# Patient Record
Sex: FEMALE | Race: WHITE
[De-identification: names, ages, dates, MRNs, and addresses within clinical notes are randomized per-mention and may not be internally consistent; named-entity substitution may affect disease eponyms.]

---

## 2017-10-11 ENCOUNTER — HOSPITAL ENCOUNTER (OUTPATIENT)
Dept: HOSPITAL 17 - HCAT | Age: 82
LOS: 1 days | Discharge: HOME | End: 2017-10-12
Attending: INTERNAL MEDICINE
Payer: MEDICARE

## 2017-10-11 VITALS
TEMPERATURE: 98.2 F | HEART RATE: 83 BPM | OXYGEN SATURATION: 98 % | RESPIRATION RATE: 19 BRPM | DIASTOLIC BLOOD PRESSURE: 72 MMHG | SYSTOLIC BLOOD PRESSURE: 132 MMHG

## 2017-10-11 VITALS
RESPIRATION RATE: 14 BRPM | TEMPERATURE: 97.7 F | HEART RATE: 77 BPM | OXYGEN SATURATION: 98 % | DIASTOLIC BLOOD PRESSURE: 72 MMHG | SYSTOLIC BLOOD PRESSURE: 156 MMHG

## 2017-10-11 VITALS
HEART RATE: 79 BPM | SYSTOLIC BLOOD PRESSURE: 146 MMHG | TEMPERATURE: 98.7 F | DIASTOLIC BLOOD PRESSURE: 72 MMHG | OXYGEN SATURATION: 100 % | RESPIRATION RATE: 18 BRPM

## 2017-10-11 VITALS — HEART RATE: 84 BPM

## 2017-10-11 VITALS
TEMPERATURE: 97.7 F | HEART RATE: 84 BPM | DIASTOLIC BLOOD PRESSURE: 61 MMHG | OXYGEN SATURATION: 96 % | RESPIRATION RATE: 16 BRPM | SYSTOLIC BLOOD PRESSURE: 110 MMHG

## 2017-10-11 VITALS — HEART RATE: 76 BPM

## 2017-10-11 VITALS — HEART RATE: 82 BPM

## 2017-10-11 VITALS — BODY MASS INDEX: 26.35 KG/M2 | WEIGHT: 154.32 LBS | HEIGHT: 64 IN

## 2017-10-11 VITALS — HEART RATE: 80 BPM

## 2017-10-11 DIAGNOSIS — I10: ICD-10-CM

## 2017-10-11 DIAGNOSIS — I25.110: Primary | ICD-10-CM

## 2017-10-11 DIAGNOSIS — E78.5: ICD-10-CM

## 2017-10-11 DIAGNOSIS — J44.9: ICD-10-CM

## 2017-10-11 LAB
ANION GAP SERPL CALC-SCNC: 8 MEQ/L (ref 5–15)
BASOPHILS # BLD AUTO: 0 TH/MM3 (ref 0–0.2)
BASOPHILS NFR BLD: 0.5 % (ref 0–2)
BUN SERPL-MCNC: 24 MG/DL (ref 7–18)
CHLORIDE SERPL-SCNC: 111 MEQ/L (ref 98–107)
EOSINOPHIL # BLD: 0.1 TH/MM3 (ref 0–0.4)
EOSINOPHIL NFR BLD: 1.2 % (ref 0–4)
ERYTHROCYTE [DISTWIDTH] IN BLOOD BY AUTOMATED COUNT: 13.7 % (ref 11.6–17.2)
GFR SERPLBLD BASED ON 1.73 SQ M-ARVRAT: 74 ML/MIN (ref 89–?)
HCO3 BLD-SCNC: 22.4 MEQ/L (ref 21–32)
HCT VFR BLD CALC: 39.3 % (ref 35–46)
HEMO FLAGS: (no result)
INR PPP: 0.9 RATIO
LYMPHOCYTES # BLD AUTO: 1.9 TH/MM3 (ref 1–4.8)
LYMPHOCYTES NFR BLD AUTO: 32.6 % (ref 9–44)
MCH RBC QN AUTO: 28.7 PG (ref 27–34)
MCHC RBC AUTO-ENTMCNC: 32.5 % (ref 32–36)
MCV RBC AUTO: 88.1 FL (ref 80–100)
MONOCYTES NFR BLD: 7.5 % (ref 0–8)
NEUTROPHILS # BLD AUTO: 3.4 TH/MM3 (ref 1.8–7.7)
NEUTROPHILS NFR BLD AUTO: 58.2 % (ref 16–70)
PLATELET # BLD: 217 TH/MM3 (ref 150–450)
POTASSIUM SERPL-SCNC: 4.1 MEQ/L (ref 3.5–5.1)
PROTHROMBIN TIME: 10.3 SEC (ref 9.8–11.6)
RBC # BLD AUTO: 4.46 MIL/MM3 (ref 4–5.3)
SODIUM SERPL-SCNC: 141 MEQ/L (ref 136–145)
WBC # BLD AUTO: 5.8 TH/MM3 (ref 4–11)

## 2017-10-11 PROCEDURE — C1769 GUIDE WIRE: HCPCS

## 2017-10-11 PROCEDURE — 85610 PROTHROMBIN TIME: CPT

## 2017-10-11 PROCEDURE — C1893 INTRO/SHEATH, FIXED,NON-PEEL: HCPCS

## 2017-10-11 PROCEDURE — 82550 ASSAY OF CK (CPK): CPT

## 2017-10-11 PROCEDURE — 80048 BASIC METABOLIC PNL TOTAL CA: CPT

## 2017-10-11 PROCEDURE — C1876 STENT, NON-COA/NON-COV W/DEL: HCPCS

## 2017-10-11 PROCEDURE — 85002 BLEEDING TIME TEST: CPT

## 2017-10-11 PROCEDURE — 85347 COAGULATION TIME ACTIVATED: CPT

## 2017-10-11 PROCEDURE — 93458 L HRT ARTERY/VENTRICLE ANGIO: CPT

## 2017-10-11 PROCEDURE — 85025 COMPLETE CBC W/AUTO DIFF WBC: CPT

## 2017-10-11 PROCEDURE — C1887 CATHETER, GUIDING: HCPCS

## 2017-10-11 PROCEDURE — 92920 PRQ TRLUML C ANGIOP 1ART&/BR: CPT

## 2017-10-11 PROCEDURE — 93005 ELECTROCARDIOGRAM TRACING: CPT

## 2017-10-11 RX ADMIN — ACETAMINOPHEN PRN MG: 325 TABLET ORAL at 22:24

## 2017-10-11 RX ADMIN — TIROFIBAN SCH MLS/HR: 5 INJECTION, SOLUTION INTRAVENOUS at 13:41

## 2017-10-11 RX ADMIN — Medication SCH ML: at 21:00

## 2017-10-11 RX ADMIN — ACETAMINOPHEN PRN MG: 325 TABLET ORAL at 15:29

## 2017-10-11 NOTE — MA
cc:

KEV COOPER M.D.

****

 

 

DATE: 10/11/2017

 

PROCEDURE PERFORMED

Left heart catheterization, left ventriculography, coronary angiography, FFR of

the mid-LAD and PCI with bare metal stent of the mid-LAD.

 

INDICATION

Unstable angina with moderate chest pressure at 3 minutes of the Denny

protocol, small fixed defect and reversible defect in the apex, EF 60%,

[place] [employer] Class 2-3 angina, coronary artery disease,

intolerance to statins.

 

PROCEDURE

The patient was brought to the cardiac catheterization lab, prepped and draped

in the usual sterile fashion. 10 ccs of 1% lidocaine was used to locally

anesthetize the right common femoral artery. A 4-[place]ican sheath was placed in

the right common femoral artery. A 4-[place] JR-4, JL-4 catheters were used to

perform left and right coronary angiography, left ventriculography.

 

FINDINGS

LV pressures 160/8-15, ejection fraction 60%.  The right coronary artery is

dominant.  There are minimal luminal irregularities up to 5-10%

angiographically in the mid segment. Left main coronary artery has a distal 20%

stenosis.  The left circumflex vessel has a long ostial proximal 40-50%

stenosis, it is very tortuous in the proximal segment, has a 90 degree bend off

the left main and then after about 10 mm a second 90 degree bend anteriorly.

The

third obtuse marginal vessel is a medium to large size vessel with smooth

proximal 20% stenosis.  The remainder of the AV groove left circumflex vessel

is a small vessel probably 1 mm in diameter, it does have a fairly high-grade

lesion just after the obtuse marginal vessel with estimated 60-70%. There is a

ramus intermedius vessel which is a medium to large size vessel which has a

mild diffuse disease in the proximal segment up to 20% angiographically. In the

mid to distal segment just proximal to a bifurcation there is a 30-40%

stenosis. There also appears to be either a second ramus intermedius

vessel/high obtuse marginal vessel that is small to medium in size with no

significant disease angiographically.  The LAD is non-transapical.

The stent in the prox mid segment has moderate diffuse in-stent stenosis up to

perhaps 50% angiographically.  At the distal stent margin and just probably a

millimeter or 2 millimeters distal to the stent margin there is a focal 50%

stenosis.  There is a long 60-70% stenosis in the ieh-rt-tuchcd segment just

beginning at the bifurcation with the second diagonal artery.  The first

diagonal artery is a small vessel probably 0.5 mm in diameter, mild diffuse

disease in the ostial proximal segment up to 20%

angiographically.  Second diagonal artery is small to medium size vessel, no

significant disease angiographically.  Third diagonal artery is small to

medium-sized vessel, no significant disease angiographically.  6-[place]ican sheath

was exchanged for the 4-[place]ican sheath. 60 units per kilo of heparin was given.

ACT was 292.  6-C[place]ican XB 3.5 guide, a .014 volcano pressure wire was placed

into the proximal LAD.  The introducer was removed, the ___ was firmly

fastened. The guide catheter was thoroughly flushed with 20 ccs of normal

saline. Pressure waveforms were normalized. The pressure wire was then advanced

to the distal LAD and the IFR was .85, this was recorded. Based on this finding

decision was made to proceed with PCI of the cap-en-sqnpcw LAD lesion. I

directly place a 2-5, 8 integrity stent, one inflation 10 atmospheres for 20

seconds.  This did reproduce the patient's chest pain, although it was more

severe then what she had on the treadmill.

Symptoms were relieved after balloon deflation. Stenosis went from 70% to 0%

with GENOVEVA-III flow. There was no compromise of the ostial diagonal vessel.  I

did stent back to the ostium of that  diagonal vessel.

 

CONCLUSION

1. New onset cardiac symptom of chest pain with fairly minimal exertion 3

     minutes of the standard Denny protocol, small fixed defect, reversible

     defect in the apical wall, nuclear stress test culprit, 70% stenosis in

     the xwp-yc-sfbkrz LAD with IFR of .85 as detailed above.

2. Otherwise mild to moderate three-vessel coronary artery disease with

     moderate diffuse in-stent stenosis of the proximal mid LAD up to 50%

     angiographically with a focal 50% stenosis approximately a millimeter

     beyond the distal stent margin as detailed above.

3. Normal LV systolic function, ejection fraction 60%.

4. IFR of the msc-gs-rgipnu LAD of .85.

5. Successful direct PCI with bare metal stent of the bro-tz-vcpwer LAD from

     70% to 0% with GENOVEVA-III flow.

6. Recommend Plavix 600 mg p.o. load then 75 mg a day for 12-15 months, aspirin

     162 mg daily, Aggrastat drip per protocol.  The patient is intolerant of

     statins.

 

 

 

                              _________________________________

                              MD NONA Meléndez/ISACC

D:  10/11/2017/1:34 PM

T:  10/11/2017/1:53 PM

Visit #:  [id number]

Job #:  [id number]

## 2017-10-11 NOTE — CATHPROC
@Pay HIS Report

Study Information

Study Number    Admission            Scheduled Start             Study Start

 

[id number]    Oct 11 2017 10:25AM       10/11/2017                Oct 11 2017 11:57AM

 

Universal Service

 

Cardiac Catheterization

 

Admit Source                Facility Department

 

Transfer in from another acute care facility [facility] - Cath Lab

 

Physician and Clinical Staff

Initial Timmy Ferrara          Circulator     Christen Guerrero,RN

 

                           Recorder      Jaz Hernandez,RT(R)

 

                           Scrub        Gretta Calvo,RT(R)

 

Procedures Performed

Procedure                      Location (Site)           Vessel Name

 

Coronary Angiograms                  LCA                Left Coronary

Coronary Angiograms                  RCA                Right Coronary

L Heart Cath

LV Gram-hand inj. LV                 LV                 Ventricle

Stent                        LAD Mid               Left Coronary

Equipment

Time           Description           Size      Mfg Part Number  Used/Scraped

                   TRANSDUCER, TRUWAVE               [id number]

12:07   FARRAR ALFARO                       *                Used

                   W/STOCKCOCK                   *[id number]

                                           [phone]



                                           *[id number]

                                           [phone]



                                           *[phone]

                                           [phone]



                                           *[id number]

                                           [id number]

12:07   [employer]    PACK, CCL CUSTOM         *                Used

                                           *[id number]

                                           [id number]

12:07   [employer] PACER       PEN, SKIN DUAL W/ RULER     *                Used

                                           *[id number]

                                           [id number]

13:25   [employer]         STENT, 2.5 8 INTEGRITY      2.5 8              Used

                                           *[id number]

                                           [id number]

13:26   [employer]       30 ROJAS INDEFLATOR                         Used

                                           *[id number]

                                           [id number]

13:09   [employer]       SHEATH, FR6.5 PRELUDE 11CM    FR 6.5     038ACT      Used

                                           *[id number]

                                           [id number]

12:07   [employer]       WIRE, 3MMJ .035 180CM      180CM              Used

                                           *[id number]

                                           [id number]

12:07   [employer]           MANIFOLD, 4 PORT         *                Used

                                           *[id number]

12:07   [employer]          OMNIPAQUE, 350 MG, 150ML     150ML     [id number]      Used

                                           [id number]

12:07   [employer]       BLANKET,WARM AIR CCL       *                Used

                                           *[id number]

                                           [id number]

12:07   [employer]      SHEATH, FR4 TERUMO (10CM)    FR 4               Used

                                           *[id number]

13:15   [employer]          PRIME WIRE, VERRATA 185CM    185CM     [id number] *[id number] Used

                   STENT, 2.5 8 RESOLUTE              [id number]   Scrap: Staff

13:30   [employer]                         2.5 8

                   INTEGRITY RX                  *[id number]     Error

 

Equipment Model, Serial, Lot Number and Expiration Data

Description             Model Number       Serial Number  Lot Number       Expiration Date

 

STENT, 2.5 8 INTEGRITY        [id number]                [id number]       02-

STENT, 2.5 8 RESOLUTE

                   [id number]               [id number]       04-

INTEGRITY RX

 

History: Allergies

Allergy              Reaction

 

Sulfa (Sulfonamide Antibiotics)  NAUSEA

 

morphine             NAUSEA

 

penicillin G           Hives

History: Risk Factors

                    Family History of

Hypertension   Dyslipidemia                  Previous MI    Previous Heart Failure

                    Premature CAD

No        Yes          Yes            No         No

Prior Valve

         Prior PCI       Prior PCIDate       Prior CABG

Surgery

No        Yes          01/01/2012        No

         Cerebrovascular    Peripheral Artery     Chronic Lung

On Dialysis                                     Diabetes

         Disease        Disease          Disease

No        No          No            Yes        No

 

 

History: Stress Tests

Stress or Imaging Studies Performed

 

No

 

 

History: Other

Current Smoker     Method     Quit            Packs a Day       Years Used     Pack Years

 

No           Cigarettes   30 Years Ago        1            40         40

 

Labs

Hgb (g/dl)       Hct (%)        WBC (l/cumm)        Platelets (thousands)

 

11.60-17.00      35.00-51.00      4.00-11.00         150..00

 

12.8          39.3         5.8             217

 

Glucose (mg/dl)    BUN (mg/dl)      Creatinine (mg/dl)    BUN:Creatinine (1:x)

74..00      7.00-18.00      0.50-1.30         10.00-20.00

 

90           24          0.7            34.3

 

Na (meq/l)       K (meq/l)

 

136..00     3.50-5.10

 

141          4.1

 

INR (PTT:PT)

 

0.90-1.10

 

0.9

 

CPK-MB (ng/ML)

 

0.50-3.60

 

Not Drawn

 

 

 

 

Medication

Medication Total Dose (Bolus/Oral)

Medication              Total Dosage/Unit

 

1% XYLOCAINE                20 mL

 

AGGRASTAT BOLUS              34.6 mL

 

FENTANYL                 12.5 mcg

 

HEPARIN                 4000 units

 

NITRO OINTMENT               2 inches

 

PLAVIX                  600 mg

 

VERSED                   2 mg

 

Medications (Bolus/Oral)

Medication          Time Given          Dosage/Unit      Administered By      Reason

               10/11/2017 12:55:32

1% XYLOCAINE                        20 mL         Timmy Dawkins

               PM

20 mL 1% XYLOCAINE given in lab by Timmy Dawkins in Right Groin via Subcutaneous.

 

VERSED            10/11/2017 1:06:00 PM      1 mg         Christen Guerrero

1 mg VERSED given in lab by Christen Guerrero, RN in Left Antecubital via Peripheral IV. Ordered by Timmy Pham.

 

FENTANYL           10/11/2017 1:08:00 PM    12.5 mcg        Christen Guerrero

12.5 mcg FENTANYL given in lab by Christen Guerrero RN in Left Antecubital via Peripheral IV. Ordered 
by Timmy Dawkins.

 

HEPARIN           10/11/2017 1:09:33 PM    4000 units       Christen Guerrero

4000 units HEPARIN given in lab by Christen Guerrero RN in Right Groin via Peripheral IV. Ordered by Timmy Butler.

 

NITRO OINTMENT        10/11/2017 1:12:00 PM      2 inches       Christen Guerrero

2 inches NITRO OINTMENT given in lab by Christen Guerrero RN. Ordered by Timmy Dawkins.

 

VERSED            10/11/2017 1:14:00 PM      1 mg         Christen Guerrero

1 mg VERSED given in lab by Christen Guerrero RN in Left Antecubital via Peripheral IV. Ordered by Timmy Pham.

 

AGGRASTAT BOLUS       10/11/2017 1:32:00 PM    34.6 mL         Christen Guerrero

34.6 mL AGGRASTAT BOLUS given in lab by Christen Guerrero RN in Left Antecubital via Peripheral IV. Or
dered by Timmy Dawkins.

 

PLAVIX            10/11/2017 1:35:00 PM     600 mg         Christen Guerrero

600 mg PLAVIX given in lab by Christen Guerrero RN via Oral. Ordered by Timmy Dawkins.

 

Medication (Drip)

Medication          Time Given          Dosage/Unit      Concentration/Unit  Diluent (ml)   Solution

 

AGGRASTAT DRIP        10/11/2017 1:33:00 PM    0.15 mcg/kg/min       12.5 mg     250        NaCl .9

0.15 mcg/kg/min AGGRASTAT DRIP given in lab by Christen Guerrero RN in Left Antecubital via Peripheral
 IV. Pump/Drip Flow = 12.4 ml/hr using NaCl

.9 with a concentration of 12.5 mg in 250 ml. Ordered by Timmy Dawkins.

Initial Case Assessment

Cardiovascular

HR           Rhythm          NIBP          Chest Pain

 

84           sr            168/100         0

 

Edema Present      Skin color           Skin

 

None           Normal             Warm Dry

 

Circulatory - Right Pulses

Dorsalis Pedis     Femoral

 

1            2

 

Scale (0,1,2,3,4,d)

 

Circulatory - Left Pulses

Dorsalis Pedis     Femoral

 

2            2

 

Scale (0,1,2,3,4,d)

 

Neurological State

            Oriented to time-place-

Alert                      Moves all extremities

            person

 

Respiration - General

Respiration Rate

            SpO2 (%)

(B/min)

15           100

 

Chronological Log

Time    Study Chronological Log

 

12:07:12  Patient arrived via Bed.

 

12:07:13  Patient Name, D.O.B, / Armband Verified By R.N.

 

12:07:13  Consent signed by the physician and the patient and verified by the Cath Lab staff.

 

12:07:14  Pre-op and post- op instructions given; patient acknowledges understanding of instructions.


 

12:07:14  Verbal Stimulation=2 Physical Stimulation=2 Airway=2 Respiration=2 TOTAL=8. (0=absent, 1=li
mited, 2=present)

 

12:07:15  Presedation assessment performed by Cath Lab RN.

 

12:07:16  Immediate Presedation assesment performed by physician.

 

12:07:17  Patient has been NPO for More than 6Hrs.

 

12:07:17  Skin Breakdown- none per pt

 

12:07:18  Patient Warmer Placed on the Table.

 

12:07:19  Cheyanne Prominences Protected

 

12:07:21  A # 22 IV was noted in the Antecubital (left). Grade = 0

 

12:07:22  History and physical on the chart or being dictated.

      Vitals capture started with the following parameters, Patient=Adult, Interval=5 min, Initial Pr
rcbxmq=023 mmHg,

12:14:27

      Deflation Rate=5 mmHg, Cuff placed on Right Arm

12:15:02  HR=90 bpm, WGJG=095/100 mmhg, SpO2=99.0 %, Resp=13 B/min

      Assessment: Initial Case, HR=84 BPM, Rhythm=sr, QNOD=997/100 mmhg, Chest Pain=0, Edema=None, Co
radha=Normal,

      Skin = Warm, Dry

      Right Pulses: Alfredo Ped=1, Femoral=2

12:17:03

      Left Pulses: Alfredo Ped=2, Femoral=2

      Neurological: State=Alert, Ox3, CALHOUN

      Respiration: Resp=15 B/min, YgX4=425 %

12:19:02  Reference ECG taken

 

12:20:07  HR=84 bpm, SQEW=196/91 mmhg, TvG4=543.0 %, Resp=10 B/min

 

12:21:09  Bilateral groins prepped with 2% chlorhexidine, and draped after a 3 minute waiting time.

 

12:25:06  HR=86 bpm, YXMC=696/94 mmhg, SpO2=99.0 %, Resp=20 B/min

 

12:28:14  MD paged

 

12:30:07  HR=82 bpm, FQBQ=334/85 mmhg, JhV3=853.0 %, Resp=8 B/min

 

12:30:08  Pressure channel 1 zeroed.

 

12:35:09  HR=77 bpm, IMLE=289/89 mmhg, SpO2=98.0 %, Resp=10 B/min

 

12:40:06  HR=77 bpm, WMNK=447/77 mmhg, SpO2=97.0 %, Resp=9 B/min

 

12:45:07  HR=74 bpm, CXVF=990/85 mmhg, SpO2=99.0 %, Resp=7 B/min

 

12:49:26  MD arrived.

 

12:50:51  HR=75 bpm, SLFT=304/82 mmhg, SpO2=99.0 %, Resp=17 B/min

      Time Out. Correct patient, correct procedure, correct physician, power injector loaded, or not 
loaded with contrast with

12:53:19

      surgical team present. Time Out Concurred by MD and individual staff in procedure.

12:54:39  Case Start

 

12:55:09  HR=79 bpm, XGVT=030/100 mmhg, VlP4=431.0 %, Resp=12 B/min

 

12:55:32  20 mL 1% XYLOCAINE given in lab by Timmy Dawkins in Right Groin via Subcutaneous.

 

12:57:50  Access site was Right Femoral Artery.

 

13:00:00  A SHEATH, FR4 TERUMO (10CM) FR 4 was advanced into the Fem Art (right) using the Percutaneo
us technique.

      A JR 4.0 INFINITI CATHETER FR 4 was advanced over a wire. OMNIPAQUE, 350 MG, 150ML 150ML was us
ed for

13:00:06

      injections.

13:00:12  HR=86 bpm, QUOM=629/101 mmhg, SpO2=99.0 %, Resp=10 B/min

      Recorded Pressure: LV, HR=81, Condition=Condition 1

13:00:36

      (Left Ventricle) /8/15

13:00:50  The LV was manually injected with 10 cc's and visualized. OMNIPAQUE, 350 MG, 150ML 150ML us
ed.

      Recorded Pressure: LV, Ao, HR=82, Condition=Condition 1

13:00:54  (Left Ventricle) /9/15,

      (Aorta) Ao 163/78/115

13:02:02  The  RCA was injected and visualized at various angles. OMNIPAQUE, 350 MG, 150ML 150ML used
.

      After removing the current catheter a JL 4.0 INFINITI CATHETER FR 4 was advanced over a WIRE, 3
MMJ .035 180CM

13:02:28

      180CM.

      Recorded Pressure: Ao, XQ=487, Condition=Condition 1

13:03:36

      (Aorta) Ao 211/117/162

13:04:03  The  LCA was injected and visualized at various angles. OMNIPAQUE, 350 MG, 150ML 150ML used
.

 

13:06:00  1 mg VERSED given in lab by Christen Guerrero RN in Left Antecubital via Peripheral IV. Orde
red by Timmy Dawkins.

 

13:06:02  LD=848 bpm, NTHV=619/130 mmhg, SpO2=99.0 %, Resp=14 B/min

      12.5 mcg FENTANYL given in lab by Christen Guerrero RN in Left Antecubital via Peripheral IV. Or
dered by Mariza,

13:08:00

      Timmy.

13:09:33  4000 units HEPARIN given in lab by Christen Guerrero, RN in Right Groin via Peripheral IV. Or
dered by Timmy Dawkins.

 

13:10:18  OZ=935 bpm, ARPO=629/113 mmhg, SpO2=99.0 %, Resp=11 B/min

 

13:12:00  2 inches NITRO OINTMENT given in lab by Christen Guerrero, RN. Ordered by Timmy Dawkins.

13:14:00  1 mg VERSED given in lab by Christen Guerrero RN in Left Antecubital via Peripheral IV. Orde
red by Timmy Dawkins.

 

13:14:44  Activated Clotting Time Drawn

      A SHEATH, FR6.5 PRELUDE 11CM FR 6.5 was exchanged in the Fem Art (right). This was necessary in
 order to

13:15:00

      accomodate a larger catheter.

13:15:17  HR=91 bpm, NIBP=160/89 mmhg, SpO2=97.0 %, Resp=9 B/min

      A XB 3.5 GUIDE CATHETER FR 6 was advanced over a wire. OMNIPAQUE, 350 MG, 150ML 150ML was used 
for

13:15:23

      injections.

13:17:24  Flow Wire was was placed in the LAD Mid. The IFR measures 0.85 Percent.

 

13:19:55  Interventional wire has crossed the lesion

 

13:20:10  HR=89 bpm, FNGQ=553/93 mmhg, SpO2=95.0 %, Resp=9 B/min

 

13:21:33  ACT (Normal Range ) = 290

      An STENT, 2.5 8 INTEGRITY 2.5 8 Bare Metal Stent was inserted through a XB 3.5 GUIDE CATHETER F
R 6 over a

13:25:00

      PRIME WIRE, VERRATA 185CM 185CM.

13:25:11  HR=91 bpm, LBZD=801/91 mmhg, SpO2=97.0 %, Resp=8 B/min

      A STENT, 2.5 8 INTEGRITY 2.5 8 was deployed using a 30 ROJAS INDEFLATOR at 10 atmospheres for 15 
seconds in the

13:25:19

      LAD Mid.

13:26:11  Delivery device removed

 

13:26:16  Wire removed

 

13:26:24  Catheter was removed

 

13:29:00  Case End

 

13:30:14  HR=95 bpm, UBTO=631/101 mmhg, SpO2=95.0 %, Resp=15 B/min

 

13:31:40  In the Fem Art (right) the SHEATH, FR6.5 PRELUDE 11CM FR 6.5 was sutured in place by Timmy Salazar.

      34.6 mL AGGRASTAT BOLUS given in lab by Christen Guerrero, RN in Left Antecubital via Peripheral 
IV. Ordered by

13:32:00

      Timmy Dawkins.

      0.15 mcg/kg/min AGGRASTAT DRIP given in lab by Christen Guerrero, RN in Left Antecubital via Lanette
pheral IV. Pump/Drip

13:33:00

      Flow = 12.4 ml/hr using NaCl .9 with a concentration of 12.5 mg in 250 ml. Ordered by Timmy Dawkins.

13:34:28  Sterile dressing applied to site

 

13:34:29  No case complications noted.

 

13:34:29  Cine recording checked.

 

13:34:34  Holding Area notified of successful intervention.

 

13:35:00  600 mg PLAVIX given in lab by Christen Guerrero RN via Oral. Ordered by Timmy Dawkins.

 

13:35:15  HR=75 bpm, GOAW=537/101 mmhg, Resp=11 B/min

 

13:39:58  Vitals capture stopped.

 

13:40:09  Implantable Device card placed in patient's chart.

 

13:40:11  Bedside Report will be given.

 

13:40:14  A Left Heart Cath was performed.

 

13:42:00  Patient moved to stretcher

 

13:47:32  Recorded Pressure: HR=?, Condition=Condition 1

End Study - Contrast Media Used In Study

Contrast      Total Opened (mL)  Total Used (mL)     Total Wasted (mL)

 

Omnipaque     80         80            0

 

End Study - Maximum Contrast Load

Max Contrast Load (mL)

 

493.5

 

End Study - Radiation Exposure

Fluoro Time

(minutes)

3.3

 

 

End Study - Patient Disposition

Complications   Transferred To       Interventional Outcome

 

No         Telemetry Bed       successful

## 2017-10-12 VITALS — HEART RATE: 68 BPM

## 2017-10-12 VITALS
OXYGEN SATURATION: 97 % | TEMPERATURE: 97.8 F | RESPIRATION RATE: 16 BRPM | HEART RATE: 72 BPM | DIASTOLIC BLOOD PRESSURE: 72 MMHG | SYSTOLIC BLOOD PRESSURE: 154 MMHG

## 2017-10-12 VITALS
HEART RATE: 75 BPM | SYSTOLIC BLOOD PRESSURE: 175 MMHG | TEMPERATURE: 97.7 F | RESPIRATION RATE: 19 BRPM | OXYGEN SATURATION: 96 % | DIASTOLIC BLOOD PRESSURE: 81 MMHG

## 2017-10-12 VITALS — HEART RATE: 70 BPM

## 2017-10-12 VITALS — HEART RATE: 86 BPM

## 2017-10-12 VITALS — HEART RATE: 90 BPM

## 2017-10-12 VITALS — HEART RATE: 72 BPM

## 2017-10-12 VITALS — HEART RATE: 75 BPM

## 2017-10-12 VITALS — HEART RATE: 78 BPM

## 2017-10-12 LAB
ANION GAP SERPL CALC-SCNC: 7 MEQ/L (ref 5–15)
BASOPHILS # BLD AUTO: 0 TH/MM3 (ref 0–0.2)
BASOPHILS NFR BLD: 0.4 % (ref 0–2)
BUN SERPL-MCNC: 20 MG/DL (ref 7–18)
CHLORIDE SERPL-SCNC: 112 MEQ/L (ref 98–107)
CK SERPL-CCNC: 85 U/L (ref 26–192)
EOSINOPHIL # BLD: 0.1 TH/MM3 (ref 0–0.4)
EOSINOPHIL NFR BLD: 1.6 % (ref 0–4)
ERYTHROCYTE [DISTWIDTH] IN BLOOD BY AUTOMATED COUNT: 13.6 % (ref 11.6–17.2)
GFR SERPLBLD BASED ON 1.73 SQ M-ARVRAT: 86 ML/MIN (ref 89–?)
HCO3 BLD-SCNC: 23.3 MEQ/L (ref 21–32)
HCT VFR BLD CALC: 35.6 % (ref 35–46)
HEMO FLAGS: (no result)
LYMPHOCYTES # BLD AUTO: 1.2 TH/MM3 (ref 1–4.8)
LYMPHOCYTES NFR BLD AUTO: 17.9 % (ref 9–44)
MCH RBC QN AUTO: 28.9 PG (ref 27–34)
MCHC RBC AUTO-ENTMCNC: 33.1 % (ref 32–36)
MCV RBC AUTO: 87.3 FL (ref 80–100)
MONOCYTES NFR BLD: 6.9 % (ref 0–8)
NEUTROPHILS # BLD AUTO: 4.8 TH/MM3 (ref 1.8–7.7)
NEUTROPHILS NFR BLD AUTO: 73.2 % (ref 16–70)
PLATELET # BLD: 199 TH/MM3 (ref 150–450)
POTASSIUM SERPL-SCNC: 3.7 MEQ/L (ref 3.5–5.1)
RBC # BLD AUTO: 4.08 MIL/MM3 (ref 4–5.3)
SODIUM SERPL-SCNC: 142 MEQ/L (ref 136–145)
WBC # BLD AUTO: 6.6 TH/MM3 (ref 4–11)

## 2017-10-12 RX ADMIN — Medication SCH ML: at 08:15

## 2017-10-12 RX ADMIN — TIROFIBAN SCH MLS/HR: 5 INJECTION, SOLUTION INTRAVENOUS at 03:49

## 2017-10-12 RX ADMIN — ACETAMINOPHEN PRN MG: 325 TABLET ORAL at 05:47

## 2017-10-12 NOTE — EKG
Date Performed: 10/12/2017       Time Performed: 05:15:24

 

PTAGE:      82 years

 

EKG:      Sinus rhythm 

 

 Extensive ST-T changes may be due to myocardial ischemia Abnormal ECG Compared to 

 

 PREVIOUS TRACING            , the nonpsecific ST-T wave changes are new but nonspecific. Clinical co
rrelation will be important. PREVIOUS TRACIN2015 11.43

 

DOCTOR:   Jill Logan  Interpretating Date/Time  10/12/2017 14:02:02

## 2018-05-02 ENCOUNTER — HOSPITAL ENCOUNTER (OUTPATIENT)
Dept: HOSPITAL 17 - PHSDC | Age: 83
Discharge: HOME | End: 2018-05-02
Attending: PAIN MEDICINE
Payer: MEDICARE

## 2018-05-02 DIAGNOSIS — M54.5: Primary | ICD-10-CM

## 2018-05-02 PROCEDURE — 62323 NJX INTERLAMINAR LMBR/SAC: CPT

## 2018-05-02 PROCEDURE — 99152 MOD SED SAME PHYS/QHP 5/>YRS: CPT

## 2018-05-02 NOTE — M6
cc:

SHAE Whyte MD

****

 

 

DATE:

05/02/2018

 

YOB: 1935.

 

PROCEDURE PERFORMED:

Fluoroscopically-guided right S1 epidural steroid injection.

 

History and physical was completed and signed.  Consent was signed.  

Procedure site was marked.  Medications were listed and reconciled.  

Pain score was recorded.  Allergies were noted.  Time out was taken.  

Fluoroscopy time was recorded where applicable.

 

Sedation was administered or directed by Dr. Whyte.  The patient 

was given oxygen.  The patient was monitored by a registered nurse.  

Total procedure time was greater than 15 minutes.

 

PROCEDURE NOTE:

IV was started.  Blood pressure cuff, pulse oximeter and EKG were 

applied.  The patient was placed in the prone position on a Sukhdev 

table, sedated with small amounts of propofol titrated to effect.  

Vital signs were monitored and remained stable throughout the 

procedure.  The lumbar area was prepped with alcohol and 10% Betadine 

solution and draped with sterile drapes.  Fluoroscopy was used to 

visualize the S1 neural foramen on the right side.  A 3-1/2-inch, 

22-gauge Chiba needle was advanced under fluoroscopic guidance through

the neural foramen.  There was negative aspiration for blood or any 

other type of fluid and the patient was given 10 mL of 0.5% Xylocaine 

and 80 mg of Depo-Medrol.

 

Following the procedure, the patient was taken to the recovery room 

with stable vital signs and neurologically intact.  She will be 

evaluated immediately and with followup to determine if she has a 

subjective decrease in her usual pain and a corresponding objective 

increase in her functional capabilities.

 

 

__________________________________

MD CATHY Hendrickson/SHERLY

D: 05/02/2018, 10:46 AM

T: 05/02/2018, 10:57 AM

Visit #: [id number]

Job #: [id number]

## 2018-05-02 NOTE — M5
cc:

SHAE Whyte MD, Deanna K MD

****

 

 

DATE OF CONSULT:

05/02/2018

 

YOB: 1935

 

Vital signs were taken including a pain score.  Medications were 

checked and reconciled.  Past medical history and past surgical 

history were updated.  Allergies were updated.  Any new imaging 

studies were reviewed.  Referring physician records were reviewed.  

BMI was recorded.  Smokers were counseled.  Fall risk was discussed.

 

SUBJECTIVE:

On 04/18/2018, we performed fluoroscopically-guided injection of Ms. Gilman's right lumbar facet joints.  She states that on 04/19/2018, 

the day after the procedure, she had 80-90% relief of her usual pain. 

She went shopping, she walked extensively and she states that relief 

lasted for 12 days, then the pain returned.

 

PHYSICAL EXAMINATION:

On exam today, the patient is complaining of right-sided low back pain

with some pain extending into the buttocks and upper aspect of her 

right lower extremity.

 

IMPRESSION:

Right lumbar facet joint arthropathy with excellent relief of her pain

with right lumbar facet joint injections.

 

RECOMMENDATION:

Radiofrequency ablation of her right lumbar facet joints in hopes of 

obtaining more prolonged relief.

 

The patient is status post lumbar laminectomy at multiple levels 

without fusion hardware and under fluoroscopic examination the target 

areas for the radiofrequency ablation will be difficult to see.  The 

procedure was explained to the patient and she does wish to proceed 

with radiofrequency ablation in hopes of obtaining more prolonged 

relief.

 

 

__________________________________

MD CATHY Hendrickson/SHERLY

D: 05/02/2018, 10:50 AM

T: 05/02/2018, 11:16 AM

Visit #: [id number]

Job #: [id number]